# Patient Record
Sex: MALE | Race: WHITE | URBAN - METROPOLITAN AREA
[De-identification: names, ages, dates, MRNs, and addresses within clinical notes are randomized per-mention and may not be internally consistent; named-entity substitution may affect disease eponyms.]

---

## 2017-05-16 ENCOUNTER — OFFICE VISIT (OUTPATIENT)
Dept: FAMILY MEDICINE CLINIC | Age: 52
End: 2017-05-16

## 2017-05-16 VITALS
TEMPERATURE: 97.1 F | BODY MASS INDEX: 23.05 KG/M2 | HEART RATE: 62 BPM | DIASTOLIC BLOOD PRESSURE: 75 MMHG | WEIGHT: 161 LBS | SYSTOLIC BLOOD PRESSURE: 137 MMHG | HEIGHT: 70 IN | RESPIRATION RATE: 17 BRPM

## 2017-05-16 DIAGNOSIS — M79.605 LEFT LEG PAIN: ICD-10-CM

## 2017-05-16 DIAGNOSIS — M62.838 SPASM OF MUSCLE: Primary | ICD-10-CM

## 2017-05-16 NOTE — PROGRESS NOTES
HPI  Griselda Chamorro is a 45y.o. year old male who presents for evaluation and treatment of:  Left Hamstring Pain. It's caused him to cut back on his running and his biking x ~1 yr.  It's mostly in his proximal hamstring. He was working with a chiropractor who was doing ART treatment. It is mild pain-wise but causes his leg feel tight and unable to engage in a FROM. Soc History  Tobacco use:    History   Smoking Status    Former Smoker   Smokeless Tobacco    Not on file         Review of Systems   Constitutional - Neg  Skin - No Rash  MSK - See HPI  Neuro - No dizziness, numbness, tingling or weakness      Objective:     Visit Vitals    /75    Pulse 62    Temp 97.1 °F (36.2 °C) (Oral)    Resp 17    Ht 5' 10\" (1.778 m)    Wt 161 lb (73 kg)    BMI 23.1 kg/m2         Constitutional: No distress, A&Ox3  MSK:     - TTP     - Muscle twitch elicited      - Fullness of muscle belly noted     - See TPI procedure note for affected muscles      Assessment and Plan   Griselda Chamorro was seen today for leg pain.     Diagnoses and all orders for this visit:    Spasm of muscle  -     VA INJECT TRIGGER POINTS, > 3    Left leg pain  -     VA INJECT TRIGGER POINTS, > 3            Trigger Point Injection Procedure Note    The following items were reviewed in detail with the patient / family PRIOR to the procedure  · Importance and mechanism of myofascial trigger point deactivation with dry needling, lidocaine injections and/or manual therapy  · Importance of becoming familiar with recommended literature on the subject  · Importance and mechanism of occlusal splint (if appropriate)  · Importance of avoiding muscle abuse, overuse or misuse  · Importance and mechanism muscle stretching 3x daily  · Importance and mechanism of physical therapy  · Importance and mechanism of correct posture  · Expected outcome of treatment and/or PT  · Medication(s) for myofascial dysfunction (OTC and Rx)  · Medication reconciliation (redundantly noted in my progress note)  · Medication allergies (redundantly noted in my progress note)  · Medical/surgical/family history (redundantly noted in my progress note)  ·     Consent (verbal and written) was obtained PRIOR to the proceudre  · Risks of bleeding, infection, pneumothorax (if applicable) or temporary worsening of symptoms were reviewed with patient. · Alternatives have been explained and offered. · Risks/benefits and significant side effects of medication(s) have been reviewed. Patient (and parent or guardian if applicable) encouraged to review package inserts for any medications. · All patient / family questions were answered. Procedural Time Out     THIS WAS PERFORMED at  PRIOR to the procedure. In addition, both the nurse and I re-verified the Patient identity Lorna Vo) with the patient Lorna Vo), I re-verified my own identity (Dr Joyce Nicholas, ) with the patient Lorna Vo) and, I re-verified the area involved which is to be treated with the Procedure of Trigger Point Injection / dry needling at this time, on this date, in this office. The patient has verbally agreed to testify that the actions described in the above paragraph did occur as stated. Procedure  · Area(s) to be injected were cleaned with medical-grade, individually packaged 70% isopropyl alcohol in a back and forth wiping fashion. · The following muscles were injected with 1cc 1% Lidocaine using a 27ga 1.5\" needle EACH in a fan-like fashion. Aspiration performed to verify lidocaine was not injected into a blood vessel.       Left Hamstring:  -Left semitendinosus  -Left semimembranous  -Left biceps femoris    Post-injection  · Trigger point(s) were compressed until hemostasis was confirmed  · Pt tolerated procedure well  · EBL was none    Post Procedure Pain Level (assessed AFTER the procedure) - the expected temporary worsening of pt's sx and the usual sensations of \"puffy\", \"stiff\" and \"vague discomfort\" were noted. Pt's eventual response to the procedure will become evident over the next several hours to days. I will reassess the complete response to the treatment during the follow up appointment. Post-injection Instructions (given AFTER the procedure) (also see AVS printed and handed directly to patient)  · Patient was instructed to apply ice several times a day for 1st 24 hours and use NSAIDs (if appropriate) should a treatment flare occur  · Patient instructed to go to Urgent Care or ER if they develop chest pain, shortness of breath or tachycardia w/in the 1st 24 hours after treatment    Patient (and parent or guardian if applicable) understands our medical plan. Alternatives have been explained and offered. Risks/benefits and significant side effects of medications have been reviewed. Patient (and parent or guardian if applicable) encouraged to review package inserts for any medications. All questions answered. The patient (or parent or guardian if applicable) is to call if condition worsens or fails to improve or if significant side effects are experienced. No future appointments.

## 2017-05-16 NOTE — PATIENT INSTRUCTIONS
Trigger Point Injection After Care    1. Apply ice several times a day for 1st 24 hours and take Aleve (with food) should a treatment flare occur. 2. Go to an Urgent Care or ER if you develop chest pain, shortness of breath or elevated heart rate w/in the 1st 24 hours after treatment (if you had any trigger point work on your chest or upper / middle back area). 3. Bruising is normal and should go away within 2 weeks    4. Apply ice to the treated muscles for 10 - 15 minutes several times during the 1st 24 hours. After that, apply heat to the areas that remain sore or in spasm. If you notice worsening of the pain after the treatment, taking Alleve or Ibuprofen (unless you are allergic to these medicines) will often help to decrease the inflammation that needling may cause. 5. Do your daily stretches and manual treatments as shown in your Trigger [employer]. It is vital that you identify anything that flares your pain. Common culprits include: poor posture, inappropriate work space arrangement, too much housework / yard work or working out too hard. What you do between treatments is just as important as getting the treatments. For Muscle or Joint Pain    Castor Oil Pack    -Castor Oil (or Peanut Oil) from the [employer]    -Something to hold the oil:    Best option: Cotton Flannel 13\" x 15\"    Dish cloth    -Medium sized heating pad    -Something to keep the heating pad from getting oil all over it   [employer]   Empty Ziplock bag   Castor Oil Pac Singh from the [place] [address] to painful area on Medium heat for 1 hour (setting it to [facility] can cause a burn and you wont realize it until after)      *The [employer] (6-659.571.9116 or www. [employer])      If your pain is not improving we need to verify the following:    -Your TSH is below 3  -Your Vit D is above 40  -Your Ferritin is above 30     Magnesium  Try Magnesium citrate 400 mg 2 to 3 times a day.     Topical Magnesium - spray to painful areas     Salon pas patch - apply to painful areas at night       Tips for upper back, shoulder and neck pain:  Look up: YouTube Video - Dr Taylor Delgado, DO  \"Scapula stabilization exercises\"       For Low Back Pain:    Look up on [employer]:  1. QL stretches  2. Pelvic Clock by Fifi Hernandez, DO   3. Get the book by Justin Boswell - Pain Free Now  4. Consider Yoga           Musculoskeletal Pain: Care Instructions  Your Care Instructions  Different problems with the bones, muscles, nerves, ligaments, and tendons in the body can cause pain. One or more areas of your body may ache or burn. Or they may feel tired, stiff, or sore. The medical term for this type of pain is musculoskeletal pain. It can have many different causes. Sometimes the pain is caused by an injury such as a strain or sprain. Or you might have pain from using one part of your body in the same way over and over again. This is called overuse. In some cases, the cause of the pain is another health problem such as arthritis or fibromyalgia. The doctor will examine you and ask you questions about your health to help find the cause of your pain. Blood tests or imaging tests like an X-ray may also be helpful. But sometimes doctors can't find a cause of the pain. Treatment depends on your symptoms and the cause of the pain, if known. The doctor has checked you carefully, but problems can develop later. If you notice any problems or new symptoms, get medical treatment right away. Follow-up care is a key part of your treatment and safety. Be sure to make and go to all appointments, and call your doctor if you are having problems. It's also a good idea to know your test results and keep a list of the medicines you take. How can you care for yourself at home? · Rest until you feel better. · Do not do anything that makes the pain worse. Return to exercise gradually if you feel better and your doctor says it's okay.   · Be safe with medicines. Read and follow all instructions on the label. ¨ If the doctor gave you a prescription medicine for pain, take it as prescribed. ¨ If you are not taking a prescription pain medicine, ask your doctor if you can take an over-the-counter medicine. · Put ice or a cold pack on the area for 10 to 20 minutes at a time to ease pain. Put a thin cloth between the ice and your skin. When should you call for help? Call your doctor now or seek immediate medical care if:  · You have new pain, or your pain gets worse. · You have new symptoms such as a fever, a rash, or chills. Watch closely for changes in your health, and be sure to contact your doctor if:  · You do not get better as expected. Where can you learn more? Go to http://yudi-jose.info/. Enter R773 in the search box to learn more about \"Musculoskeletal Pain: Care Instructions. \"  Current as of: October 14, 2016  Content Version: 11.2  © 3726-1512 [employer]. Care instructions adapted under license by [employer] (which disclaims liability or warranty for this information). If you have questions about a medical condition or this instruction, always ask your healthcare professional. [place] [address] any warranty or liability for your use of this information.

## 2017-05-16 NOTE — MR AVS SNAPSHOT
Visit Information Date & Time Provider Department Dept. Phone Encounter #  
 5/16/2017  2:00 PM Daryle SingleRojelio [id number] Your Appointments 5/25/2017  3:15 PM  
MANIPULATION TRIGGER POINTS with Daryle Single, DO [facility] ([facility]) Appt Note: Dry needling Jaqueline Fredi. [address]  
  
   
 [address] [place] Upcoming Health Maintenance Date Due Hepatitis C Screening 1965 DTaP/Tdap/Td series (1 - Tdap) 10/2/1985 FOBT Q 1 YEAR AGE 50-75 10/2/2014 INFLUENZA AGE 9 TO ADULT 8/1/2017 Allergies as of 5/16/2017  Review Complete On: 5/16/2017 By: Daryle Single, DO No Known Allergies Current Immunizations  Reviewed on 5/16/2017 No immunizations on file. Reviewed by Ni Felix LPN on 4/87/9763 at  2:10 PM  
Vitals BP Pulse Temp Resp Height(growth percentile) Weight(growth percentile)  
 137/75 62 97.1 °F (36.2 °C) (Oral) 17 5' 10\" (1.778 m) 161 lb (73 kg) BMI Smoking Status 23.1 kg/m2 Former Smoker Vitals History BMI and BSA Data Body Mass Index Body Surface Area  
 23.1 kg/m 2 1.9 m 2 Your Updated Medication List  
  
Notice  As of 5/16/2017  3:05 PM  
 You have not been prescribed any medications. Patient Instructions Trigger Point Injection After Care 1. Apply ice several times a day for 1st 24 hours and take Aleve (with food) should a treatment flare occur. 2. Go to an Urgent Care or ER if you develop chest pain, shortness of breath or elevated heart rate w/in the 1st 24 hours after treatment (if you had any trigger point work on your chest or upper / middle back area). 3. Bruising is normal and should go away within 2 weeks 4.  Apply ice to the treated muscles for 10  15 minutes several times during the 1st 24 hours. After that, apply heat to the areas that remain sore or in spasm. If you notice worsening of the pain after the treatment, taking Alleve or Ibuprofen (unless you are allergic to these medicines) will often help to decrease the inflammation that needling may cause. 5. Do your daily stretches and manual treatments as shown in your Trigger [employer]. It is vital that you identify anything that flares your pain. Common culprits include: poor posture, inappropriate work space arrangement, too much housework / yard work or working out too hard. What you do between treatments is just as important as getting the treatments. For Muscle or Joint Pain Castor Oil Pack 
 
-Castor Oil (or Peanut Oil) from the [employer] 
 
-Something to hold the oil:  
 Best option: Cotton Flannel 13\" x 15\" Dish cloth 
 
-Medium sized heating pad 
 
-Something to keep the heating pad from getting oil all over it Brian Wrap 
 Empty Ziplock bag Castor Oil Pac Singh from the [employer]* Apply to painful area on Medium heat for 1 hour (setting it to [facility] can cause a burn and you wont realize it until after) *The [employer] (8-616-125-944-169-8393 or www. [employer]) If your pain is not improving we need to verify the following: 
 
-Your TSH is below 3 
-Your Vit D is above 40 
-Your Ferritin is above 30 Magnesium Try Magnesium citrate 400 mg 2 to 3 times a day. 
  
Topical Magnesium - spray to painful areas 
  
Salon pas patch - apply to painful areas at night 
  
 
Tips for upper back, shoulder and neck pain: 
Look up: YouTube Video - Dr Juancho Ornelas, DO \"Scapula stabilization exercises\" 
  
 
For Low Back Pain: 
 
Look up on Google: 1. QL stretches 2. Pelvic Clock by Carley Topete DO  
3. Get the book by Knute Pace - Pain Free Now 4. Consider Yoga Musculoskeletal Pain: Care Instructions Your Care Instructions Different problems with the bones, muscles, nerves, ligaments, and tendons in the body can cause pain. One or more areas of your body may ache or burn. Or they may feel tired, stiff, or sore. The medical term for this type of pain is musculoskeletal pain. It can have many different causes. Sometimes the pain is caused by an injury such as a strain or sprain. Or you might have pain from using one part of your body in the same way over and over again. This is called overuse. In some cases, the cause of the pain is another health problem such as arthritis or fibromyalgia. The doctor will examine you and ask you questions about your health to help find the cause of your pain. Blood tests or imaging tests like an X-ray may also be helpful. But sometimes doctors can't find a cause of the pain. Treatment depends on your symptoms and the cause of the pain, if known. The doctor has checked you carefully, but problems can develop later. If you notice any problems or new symptoms, get medical treatment right away. Follow-up care is a key part of your treatment and safety. Be sure to make and go to all appointments, and call your doctor if you are having problems. It's also a good idea to know your test results and keep a list of the medicines you take. How can you care for yourself at home? · Rest until you feel better. · Do not do anything that makes the pain worse. Return to exercise gradually if you feel better and your doctor says it's okay. · Be safe with medicines. Read and follow all instructions on the label. ¨ If the doctor gave you a prescription medicine for pain, take it as prescribed. ¨ If you are not taking a prescription pain medicine, ask your doctor if you can take an over-the-counter medicine. · Put ice or a cold pack on the area for 10 to 20 minutes at a time to ease pain. Put a thin cloth between the ice and your skin. When should you call for help? Call your doctor now or seek immediate medical care if: 
· You have new pain, or your pain gets worse. · You have new symptoms such as a fever, a rash, or chills. Watch closely for changes in your health, and be sure to contact your doctor if: 
· You do not get better as expected. Where can you learn more? Go to http://yudi-jose.info/. Enter X149 in the search box to learn more about \"Musculoskeletal Pain: Care Instructions. \" Current as of: October 14, 2016 Content Version: 11.2 © 9188-4237 [employer]. Care instructions adapted under license by [employer] (which disclaims liability or warranty for this information). If you have questions about a medical condition or this instruction, always ask your healthcare professional. [address] any warranty or liability for your use of this information. Introducing [facility]! Mahesh Joya introduces [employer] patient portal. Now you can access parts of your medical record, email your doctor's office, and request medication refills online. 1. In your internet browser, go to [email or web address]. [email or web address] 2. Click on the First Time User? Click Here link in the Sign In box. You will see the New Member Sign Up page. 3. Enter your [employer] Access Code exactly as it appears below. You will not need to use this code after youve completed the sign-up process. If you do not sign up before the expiration date, you must request a new code. · [employer] Access Code: [email or web address] Expires: 8/14/2017  3:04 PM 
 
4. Enter the last four digits of your Social Security Number (xxxx) and Date of Birth (mm/dd/yyyy) as indicated and click Submit. You will be taken to the next sign-up page. 5. Create a [employer] ID. This will be your [employer] login ID and cannot be changed, so think of one that is secure and easy to remember. 6. Create a [employer] password. You can change your password at any time. 7. Enter your Password Reset Question and Answer. This can be used at a later time if you forget your password. 8. Enter your e-mail address. You will receive e-mail notification when new information is available in [address] 9. Click Sign Up. You can now view and download portions of your medical record. 10. Click the Download Summary menu link to download a portable copy of your medical information. If you have questions, please visit the Frequently Asked Questions section of the [employer] website. Remember, [employer] is NOT to be used for urgent needs. For medical emergencies, dial 911. Now available from your iPhone and Android! Please provide this summary of care documentation to your next provider. Your primary care clinician is listed as SILVIA POST. If you have any questions after today's visit, please call [phone].

## 2017-05-25 ENCOUNTER — OFFICE VISIT (OUTPATIENT)
Dept: FAMILY MEDICINE CLINIC | Age: 52
End: 2017-05-25

## 2017-05-25 VITALS
DIASTOLIC BLOOD PRESSURE: 89 MMHG | RESPIRATION RATE: 17 BRPM | WEIGHT: 161 LBS | SYSTOLIC BLOOD PRESSURE: 124 MMHG | HEART RATE: 66 BPM | HEIGHT: 70 IN | TEMPERATURE: 97.1 F | BODY MASS INDEX: 23.05 KG/M2

## 2017-05-25 DIAGNOSIS — M62.838 SPASM OF MUSCLE: ICD-10-CM

## 2017-05-25 DIAGNOSIS — M79.605 LEG PAIN, POSTERIOR, LEFT: Primary | ICD-10-CM

## 2017-05-25 NOTE — PROGRESS NOTES
HPI  Lindsay Martínez is a 45y.o. year old male who presents for evaluation and treatment of:      Left hamstring pain. Symptoms are:  improved  - joseph on the bike today. He was also able to run a 10 mile race w/ out thinking about his hamstring. Pain is achy and  - 3 - 4/10        Review of Systems   Constitutional - Neg  Skin - No Rash, he has some bruising on his proximal left hamstring from the last treatment  MSK - See HPI  Neuro - No dizziness, numbness, tingling or weakness      Objective:     Visit Vitals    /89    Pulse 66    Temp 97.1 °F (36.2 °C) (Oral)    Resp 17    Ht 5' 10\" (1.778 m)    Wt 161 lb (73 kg)    BMI 23.1 kg/m2         Constitutional: No distress, A&Ox3  MSK:     - TTP     - Muscle twitch elicited      - Fullness of muscle belly noted     - See TPI procedure note for affected muscles     - Physical signs of Myofascial Dysfunction are:  improved     Assessment and Plan   Lindsay Martínez was seen today for leg pain.     Diagnoses and all orders for this visit:    Leg pain, posterior, left  -     OH INJECT TRIGGER POINTS, > 3    Spasm of muscle  -     OH INJECT TRIGGER POINTS, > 3            Trigger Point Injection Procedure Note    The following items were reviewed in detail with the patient / family PRIOR to the procedure  · Importance and mechanism of myofascial trigger point deactivation with dry needling, lidocaine injections and/or manual therapy  · Importance of becoming familiar with recommended literature on the subject  · Importance and mechanism of occlusal splint (if appropriate)  · Importance of avoiding muscle abuse, overuse or misuse  · Importance and mechanism muscle stretching 3x daily  · Importance and mechanism of physical therapy  · Importance and mechanism of correct posture  · Expected outcome of treatment and/or PT  · Medication(s) for myofascial dysfunction (OTC and Rx)  · Medication reconciliation (redundantly noted in my progress note)  · Medication allergies (redundantly noted in my progress note)  · Medical/surgical/family history (redundantly noted in my progress note)  ·     Consent (verbal and written) was obtained PRIOR to the proceudre  · Risks of bleeding, infection, pneumothorax (if applicable) or temporary worsening of symptoms were reviewed with patient. · Alternatives have been explained and offered. · Risks/benefits and significant side effects of medication(s) have been reviewed. Patient (and parent or guardian if applicable) encouraged to review package inserts for any medications. · All patient / family questions were answered. Procedural Time Out     THIS WAS PERFORMED PRIOR to the procedure. In addition, both the nurse and I re-verified the Patient identity Lisa Penn) with the patient Lisa Penn), I re-verified my own identity (Dr Stan Marshall, ) with the patient Lisa Penn) and, I re-verified the area involved which is to be treated with the Procedure of Trigger Point Injection / dry needling at this time, on this date, in this office. The patient has verbally agreed to testify that the actions described in the above paragraph did occur as stated. Procedure  · Area(s) to be injected were cleaned with medical-grade, individually packaged 70% isopropyl alcohol in a back and forth wiping fashion. · The following muscles were injected with 1cc 1% Lidocaine using a 27ga 1.5\" needle EACH in a fan-like fashion. Aspiration performed to verify lidocaine was not injected into a blood vessel.                   Biceps Femoris  LT, Semitendinosus  LT, Semimembranosus  LT and Gracilis  LT                Post-injection  · Trigger point(s) were compressed until hemostasis was confirmed  · Pt tolerated procedure Well  · EBL was 3 drops    Post Procedure Pain Level (assessed AFTER the procedure) - the expected temporary worsening of pt's sx and the usual sensations of \"puffy\", \"stiff\" and \"vague discomfort\" were noted.  Pt's eventual response to the procedure will become evident over the next several hours to days. I will reassess the complete response to the treatment during the follow up appointment. Post-injection Instructions (given AFTER the procedure) (also see AVS printed and handed directly to patient)  · Patient was instructed to apply ice several times a day for 1st 24 hours and use NSAIDs (if appropriate) should a treatment flare occur  · Patient instructed to go to Urgent Care or ER if they develop chest pain, shortness of breath or tachycardia w/in the 1st 24 hours after treatment    Patient (and parent or guardian if applicable) understands our medical plan. Alternatives have been explained and offered. Risks/benefits and significant side effects of medications have been reviewed. Patient (and parent or guardian if applicable) encouraged to review package inserts for any medications. All questions answered. The patient (or parent or guardian if applicable) is to call if condition worsens or fails to improve or if significant side effects are experienced. No future appointments.

## 2017-05-25 NOTE — MR AVS SNAPSHOT
Visit Information Date & Time Provider Department Dept. Phone Encounter #  
 5/25/2017  3:15 PM Jackie Lanes, [address] [facility],4Th Floor [phone] [id number] Your Appointments 6/1/2017  3:00 PM  
MANIPULATION TRIGGER POINTS with Jackie Lanes, DO  
[facility] [employer] ([address]) Appt Note: trigger points Jaqueline FrediCasimiro [address]  
  
   
 [address] [facility] Upcoming Health Maintenance Date Due Hepatitis C Screening 1965 DTaP/Tdap/Td series (1 - Tdap) 10/2/1985 FOBT Q 1 YEAR AGE 50-75 10/2/2014 INFLUENZA AGE 9 TO ADULT 8/1/2017 Allergies as of 5/25/2017  Review Complete On: 5/25/2017 By: Jackie Lanes, DO No Known Allergies Current Immunizations  Reviewed on 5/25/2017 No immunizations on file. Reviewed by Manuel Millan LPN on 8/46/5537 at  3:13 PM  
You Were Diagnosed With   
  
 Codes Comments Leg pain, posterior, left    -  Primary ICD-10-CM: M79.605 ICD-9-CM: 729.5 Spasm of muscle     ICD-10-CM: Q54.416 ICD-9-CM: 728.85 Vitals BP Pulse Temp Resp Height(growth percentile) Weight(growth percentile) 124/89 66 97.1 °F (36.2 °C) (Oral) 17 5' 10\" (1.778 m) 161 lb (73 kg) BMI Smoking Status 23.1 kg/m2 Former Smoker Vitals History BMI and BSA Data Body Mass Index Body Surface Area  
 23.1 kg/m 2 1.9 m 2 Your Updated Medication List  
  
Notice  As of 5/25/2017 11:59 PM  
 You have not been prescribed any medications. We Performed the Following ME INJECT TRIGGER POINTS, > 3 [id number] CPT(R)] Introducing [facility] & HEALTH SERVICES! [facility] introduces [employer] patient portal. Now you can access parts of your medical record, email your doctor's office, and request medication refills online. 1. In your internet browser, go to [email or web address] 2. Click on the First Time User? Click Here link in the Sign In box. You will see the New Member Sign Up page. 3. Enter your [employer] Access Code exactly as it appears below. You will not need to use this code after youve completed the sign-up process. If you do not sign up before the expiration date, you must request a new code. · [employer] Access Code: [email or web address] Expires: 8/14/2017  3:04 PM 
 
4. Enter the last four digits of your Social Security Number (xxxx) and Date of Birth (mm/dd/yyyy) as indicated and click Submit. You will be taken to the next sign-up page. 5. Create a [employer] ID. This will be your [employer] login ID and cannot be changed, so think of one that is secure and easy to remember. 6. Create a [employer] password. You can change your password at any time. 7. Enter your Password Reset Question and Answer. This can be used at a later time if you forget your password. 8. Enter your e-mail address. You will receive e-mail notification when new information is available in [address] 9. Click Sign Up. You can now view and download portions of your medical record. 10. Click the Download Summary menu link to download a portable copy of your medical information. If you have questions, please visit the Frequently Asked Questions section of the [employer] website. Remember, [employer] is NOT to be used for urgent needs. For medical emergencies, dial 911. Now available from your iPhone and Android! Please provide this summary of care documentation to your next provider. Your primary care clinician is listed as SILVIA POST. If you have any questions after today's visit, please call [phone].

## 2017-06-01 ENCOUNTER — OFFICE VISIT (OUTPATIENT)
Dept: FAMILY MEDICINE CLINIC | Age: 52
End: 2017-06-01

## 2017-06-01 VITALS
TEMPERATURE: 97.2 F | RESPIRATION RATE: 18 BRPM | WEIGHT: 161 LBS | DIASTOLIC BLOOD PRESSURE: 80 MMHG | BODY MASS INDEX: 23.05 KG/M2 | SYSTOLIC BLOOD PRESSURE: 124 MMHG | HEART RATE: 60 BPM | HEIGHT: 70 IN

## 2017-06-01 DIAGNOSIS — M62.838 SPASM OF MUSCLE: Primary | ICD-10-CM

## 2017-06-01 DIAGNOSIS — M79.605 LEFT LEG PAIN: ICD-10-CM

## 2017-06-01 NOTE — PROGRESS NOTES
HPI  Perico Sweet is a 45y.o. year old male who presents for evaluation and treatment of:  Left hamstring pain    Symptoms are:  Improved - pain is mild         Is Perico Sweet performing the stretching and self-treatment on a regular basis? yes  Is Perico Sweet getting massage on a regular basis? yes    Medications patient is using:         Soc History  Tobacco use:    History   Smoking Status    Former Smoker   Smokeless Tobacco    Not on file         Review of Systems   Constitutional - Neg  Skin - No Rash  MSK - See HPI  Neuro - No dizziness, numbness, tingling or weakness      Objective:     Visit Vitals    /80    Pulse 60    Temp 97.2 °F (36.2 °C) (Oral)    Resp 18    Ht 5' 10\" (1.778 m)    Wt 161 lb (73 kg)    BMI 23.1 kg/m2         Constitutional: No distress, A&Ox3  MSK:     - TTP     - Muscle twitch elicited      - Fullness of muscle belly noted     - See TPI procedure note for affected muscles     - Physical signs of Myofascial Dysfunction are:  improved     Assessment and Plan   Perico Sweet was seen today for claudication.     Diagnoses and all orders for this visit:    Spasm of muscle  -     AL INJECT TRIGGER POINTS, > 3    Left leg pain  -     AL INJECT TRIGGER POINTS, > 3        Trigger Point Injection Procedure Note    The following items were reviewed in detail with the patient / family PRIOR to the procedure  · Importance and mechanism of myofascial trigger point deactivation with dry needling, lidocaine injections and/or manual therapy  · Importance of becoming familiar with recommended literature on the subject  · Importance and mechanism of occlusal splint (if appropriate)  · Importance of avoiding muscle abuse, overuse or misuse  · Importance and mechanism muscle stretching 3x daily  · Importance and mechanism of physical therapy  · Importance and mechanism of correct posture  · Expected outcome of treatment and/or PT  · Medication(s) for myofascial dysfunction (OTC and Rx)  · Medication reconciliation (redundantly noted in my progress note)  · Medication allergies (redundantly noted in my progress note)  · Medical/surgical/family history (redundantly noted in my progress note)  ·     Consent (verbal and written) was obtained PRIOR to the proceudre  · Risks of bleeding, infection, pneumothorax (if applicable) or temporary worsening of symptoms were reviewed with patient. · Alternatives have been explained and offered. · Risks/benefits and significant side effects of medication(s) have been reviewed. Patient (and parent or guardian if applicable) encouraged to review package inserts for any medications. · All patient / family questions were answered. Procedural Time Out     THIS WAS PERFORMED  PRIOR to the procedure. In addition, both the nurse and I re-verified the Patient identity Katelyn Albert) with the patient Katelyn Albert), I re-verified my own identity (Dr Gayle Ruiz, DO) with the patient Katelyn Albert) and, I re-verified the area involved which is to be treated with the Procedure of Trigger Point Injection / dry needling at this time, on this date, in this office. The patient has verbally agreed to testify that the actions described in the above paragraph did occur as stated. Procedure  · Area(s) to be injected were cleaned with medical-grade, individually packaged 70% isopropyl alcohol in a back and forth wiping fashion. · The following muscles were injected with 1cc 1% Lidocaine using a 27ga 1.5\" needle EACH in a fan-like fashion. Aspiration performed to verify lidocaine was not injected into a blood vessel.                   Adductor Jose A  LT, Adductor Longus  LT, Semitendinosus  LT, Semimembranosus  LT and Gracilis  LT                Post-injection  · Trigger point(s) were compressed until hemostasis was confirmed  · Pt tolerated procedure Well  · EBL was Zero    Post Procedure Pain Level (assessed AFTER the procedure) - the expected temporary worsening of pt's sx and the usual sensations of \"puffy\", \"stiff\" and \"vague discomfort\" were noted. Pt's eventual response to the procedure will become evident over the next several hours to days. I will reassess the complete response to the treatment during the follow up appointment. Post-injection Instructions (given AFTER the procedure) (also see AVS printed and handed directly to patient)  · Patient was instructed to apply ice several times a day for 1st 24 hours and use NSAIDs (if appropriate) should a treatment flare occur  · Patient instructed to go to Urgent Care or ER if they develop chest pain, shortness of breath or tachycardia w/in the 1st 24 hours after treatment    Patient (and parent or guardian if applicable) understands our medical plan. Alternatives have been explained and offered. Risks/benefits and significant side effects of medications have been reviewed. Patient (and parent or guardian if applicable) encouraged to review package inserts for any medications. All questions answered. The patient (or parent or guardian if applicable) is to call if condition worsens or fails to improve or if significant side effects are experienced. No future appointments.

## 2017-06-01 NOTE — PATIENT INSTRUCTIONS
My favorite water filter (also alkalinizes your water - much better than Monica)    pH REFRESH Alkaline Water Pitcher Ionizer With 2 Long-Life Filters - Alkaline  Machine - [employer], 84oz, 2.5L (2017 Model) (White)   Sold on [place] w/ Free Shipping      Vitamin Shoppe 8oz  Liquid Minerals 40 drops twice a day. Trigger Point Injection After Care    1. Apply ice several times a day for 1st 24 hours and take Aleve (with food) should a treatment flare occur. 2. Go to an Urgent Care or ER if you develop chest pain, shortness of breath or elevated heart rate w/in the 1st 24 hours after treatment (if you had any trigger point work on your chest or upper / middle back area). 3. Bruising is normal and should go away within 2 weeks    4. Apply ice to the treated muscles for 10 - 15 minutes several times during the 1st 24 hours. After that, apply heat to the areas that remain sore or in spasm. If you notice worsening of the pain after the treatment, taking Alleve or Ibuprofen (unless you are allergic to these medicines) will often help to decrease the inflammation that needling may cause. 5. Do your daily stretches and manual treatments as shown in your [employer]. It is vital that you identify anything that flares your pain. Common culprits include: poor posture, inappropriate work space arrangement, too much housework / yard work or working out too hard. What you do between treatments is just as important as getting the treatments.       For Muscle or Joint Pain    Castor Oil Pack    -Castor Oil (or Peanut Oil) from the Vitamin Shoppe    -Something to hold the oil:    Best option: Cotton Flannel 13\" x 15\"    Dish cloth    -Medium sized heating pad    -Something to keep the heating pad from getting oil all over it   [employer]   Empty Ziplock bag   Castor Oil Pac Singh from the [employer]*    Apply to painful area on Medium heat for 1 hour (setting it to High can cause a burn and you wont realize it until after)      *The [employer] (1-147.832.2027 or www. [employer])      If your pain is not improving we need to verify the following:    -Your TSH is below 3  -Your Vit D is above 40  -Your Ferritin is above 30     Magnesium  Try Magnesium citrate 400 mg 2 to 3 times a day.     Topical Magnesium - spray to painful areas     Salon pas patch - apply to painful areas at night

## 2017-06-06 PROBLEM — M79.605 LEFT LEG PAIN: Status: ACTIVE | Noted: 2017-06-06

## 2017-06-22 ENCOUNTER — OFFICE VISIT (OUTPATIENT)
Dept: FAMILY MEDICINE CLINIC | Age: 52
End: 2017-06-22

## 2017-06-22 VITALS
WEIGHT: 160 LBS | RESPIRATION RATE: 17 BRPM | DIASTOLIC BLOOD PRESSURE: 91 MMHG | HEIGHT: 70 IN | BODY MASS INDEX: 22.9 KG/M2 | HEART RATE: 74 BPM | SYSTOLIC BLOOD PRESSURE: 129 MMHG | TEMPERATURE: 96.6 F

## 2017-06-22 DIAGNOSIS — M79.605 LEFT LEG PAIN: Primary | ICD-10-CM

## 2017-06-22 DIAGNOSIS — M62.838 SPASM OF MUSCLE: ICD-10-CM

## 2017-06-22 NOTE — PROGRESS NOTES
HPI  Jt Zabmrano is a 45y.o. year old male who presents for evaluation and treatment of:    Left hamstring pain    Symptoms are:  Improved: no pain at all during a recent race ([place] 1/2 Fe Man In 5:17) and then felt great during a long run not long after. He irritated it while riding his hybrid fit bike ride today. Pain is achy and 2 - 3/10    Is Jt Zambrano performing the stretching and self-treatment on a regular basis? yes  Is Jt Zambrano getting massage on a regular basis? no        Review of Systems   Constitutional - Neg  Skin - No Rash  MSK - See HPI  Neuro - No dizziness, numbness, tingling or weakness      Objective:     Visit Vitals    BP (!) 129/91    Pulse 74    Temp 96.6 °F (35.9 °C) (Oral)    Resp 17    Ht 5' 10\" (1.778 m)    Wt 160 lb (72.6 kg)    BMI 22.96 kg/m2         Constitutional: No distress, A&Ox3  MSK:     - TTP     - Muscle twitch elicited      - Fullness of muscle belly noted     - See TPI procedure note for affected muscles     - Physical signs of Myofascial Dysfunction are:  significantly improved     Assessment and Plan   There are no diagnoses linked to this encounter.         Trigger Point Injection Procedure Note    The following items were reviewed in detail with the patient / family PRIOR to the procedure  · Importance and mechanism of myofascial trigger point deactivation with dry needling, lidocaine injections and/or manual therapy  · Importance of becoming familiar with recommended literature on the subject  · Importance and mechanism of occlusal splint (if appropriate)  · Importance of avoiding muscle abuse, overuse or misuse  · Importance and mechanism muscle stretching 3x daily  · Importance and mechanism of physical therapy  · Importance and mechanism of correct posture  · Expected outcome of treatment and/or PT  · Medication(s) for myofascial dysfunction (OTC and Rx)  · Medication reconciliation (redundantly noted in my progress note)  · Medication allergies (redundantly noted in my progress note)  · Medical/surgical/family history (redundantly noted in my progress note)  ·     Consent (verbal and written) was obtained PRIOR to the proceudre  · Risks of bleeding, infection, pneumothorax (if applicable) or temporary worsening of symptoms were reviewed with patient. · Alternatives have been explained and offered. · Risks/benefits and significant side effects of medication(s) have been reviewed. Patient (and parent or guardian if applicable) encouraged to review package inserts for any medications. · All patient / family questions were answered. Procedural Time Out     THIS WAS PERFORMED PRIOR to the procedure. In addition, both the nurse and I re-verified the Patient identity Mar Bush) with the patient Mar Bush), I re-verified my own identity (Dr Kang Bullock DO) with the patient Mar Bush) and, I re-verified the area involved which is to be treated with the Procedure of Trigger Point Injection / dry needling at this time, on this date, in this office. The patient has verbally agreed to testify that the actions described in the above paragraph did occur as stated. Procedure  · Area(s) to be injected were cleaned with medical-grade, individually packaged 70% isopropyl alcohol in a back and forth wiping fashion. · The following muscles were injected with 1cc 1% Lidocaine using a 27ga 1.5\" needle EACH in a fan-like fashion. Aspiration performed to verify lidocaine was not injected into a blood vessel. Semitendinosus  LT, Semimembranosus  LT and Gracilis  LT      Post-injection  · Trigger point(s) were compressed until hemostasis was confirmed  · Pt tolerated procedure well  · EBL was none    Post Procedure Pain Level (assessed AFTER the procedure) - the expected temporary worsening of pt's sx and the usual sensations of \"puffy\", \"stiff\" and \"vague discomfort\" were noted.   Pt's eventual response to the procedure will become evident over the next several hours to days. I will reassess the complete response to the treatment during the follow up appointment. Post-injection Instructions (given AFTER the procedure) (also see AVS printed and handed directly to patient)  · Patient was instructed to apply ice several times a day for 1st 24 hours and use NSAIDs (if appropriate) should a treatment flare occur  · Patient instructed to go to Urgent Care or ER if they develop chest pain, shortness of breath or tachycardia w/in the 1st 24 hours after treatment    Patient (and parent or guardian if applicable) understands our medical plan. Alternatives have been explained and offered. Risks/benefits and significant side effects of medications have been reviewed. Patient (and parent or guardian if applicable) encouraged to review package inserts for any medications. All questions answered. The patient (or parent or guardian if applicable) is to call if condition worsens or fails to improve or if significant side effects are experienced. No future appointments.

## 2017-06-22 NOTE — MR AVS SNAPSHOT
Visit Information Date & Time Provider Department Dept. Phone Encounter #  
 6/22/2017  3:30 PM Jose Miguel Cabrera, [address] [facility],4Th Floor [phone] [id number] Upcoming Health Maintenance Date Due Hepatitis C Screening 1965 DTaP/Tdap/Td series (1 - Tdap) 10/2/1985 FOBT Q 1 YEAR AGE 50-75 10/2/2014 INFLUENZA AGE 9 TO ADULT 8/1/2017 Allergies as of 6/22/2017  Review Complete On: 6/22/2017 By: Cuate Coles LPN No Known Allergies Current Immunizations  Reviewed on 6/22/2017 No immunizations on file. Reviewed by Cuate Coles LPN on 4/72/3178 at  3:26 PM  
You Were Diagnosed With   
  
 Codes Comments Left leg pain    -  Primary ICD-10-CM: M79.605 ICD-9-CM: 729.5 Spasm of muscle     ICD-10-CM: Z26.253 ICD-9-CM: 728.85 Vitals BP Pulse Temp Resp Height(growth percentile) Weight(growth percentile) (!) 129/91 74 96.6 °F (35.9 °C) (Oral) 17 5' 10\" (1.778 m) 160 lb (72.6 kg) BMI Smoking Status 22.96 kg/m2 Former Smoker Vitals History BMI and BSA Data Body Mass Index Body Surface Area  
 22.96 kg/m 2 1.89 m 2 Your Updated Medication List  
  
Notice  As of 6/22/2017 11:59 PM  
 You have not been prescribed any medications. We Performed the Following [place] INJECT TRIGGER POINTS, > 3 [id number] CPT(R)] Introducing [facility] & HEALTH SERVICES! Mayi Doran introduces [employer] patient portal. Now you can access parts of your medical record, email your doctor's office, and request medication refills online. 1. In your internet browser, go to [email or web address] 2. Click on the First Time User? Click Here link in the Sign In box. You will see the New Member Sign Up page. 3. Enter your [employer] Access Code exactly as it appears below. You will not need to use this code after youve completed the sign-up process.  If you do not sign up before the expiration date, you must request a new code. · General Blood Access Code: [email or web address] Expires: 8/14/2017  3:04 PM 
 
4. Enter the last four digits of your Social Security Number (xxxx) and Date of Birth (mm/dd/yyyy) as indicated and click Submit. You will be taken to the next sign-up page. 5. Create a General Blood ID. This will be your General Blood login ID and cannot be changed, so think of one that is secure and easy to remember. 6. Create a General Blood password. You can change your password at any time. 7. Enter your Password Reset Question and Answer. This can be used at a later time if you forget your password. 8. Enter your e-mail address. You will receive e-mail notification when new information is available in [address] 9. Click Sign Up. You can now view and download portions of your medical record. 10. Click the Download Summary menu link to download a portable copy of your medical information. If you have questions, please visit the Frequently Asked Questions section of the [employer] website. Remember, [employer] is NOT to be used for urgent needs. For medical emergencies, dial 911. Now available from your iPhone and Android! Please provide this summary of care documentation to your next provider. Your primary care clinician is listed as SILVIA POST. If you have any questions after today's visit, please call [phone].